# Patient Record
Sex: FEMALE | Race: WHITE | Employment: STUDENT | URBAN - METROPOLITAN AREA
[De-identification: names, ages, dates, MRNs, and addresses within clinical notes are randomized per-mention and may not be internally consistent; named-entity substitution may affect disease eponyms.]

---
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
## 2022-03-14 ENCOUNTER — OFFICE VISIT (OUTPATIENT)
Dept: OBGYN CLINIC | Facility: CLINIC | Age: 22
End: 2022-03-14
Payer: COMMERCIAL

## 2022-03-14 VITALS
DIASTOLIC BLOOD PRESSURE: 73 MMHG | SYSTOLIC BLOOD PRESSURE: 113 MMHG | HEART RATE: 70 BPM | WEIGHT: 114 LBS | BODY MASS INDEX: 20 KG/M2

## 2022-03-14 DIAGNOSIS — Z76.0 MEDICATION REFILL: ICD-10-CM

## 2022-03-14 DIAGNOSIS — Z01.419 WELL WOMAN EXAM WITH ROUTINE GYNECOLOGICAL EXAM: Primary | ICD-10-CM

## 2022-03-14 PROCEDURE — 99395 PREV VISIT EST AGE 18-39: CPT | Performed by: CLINICAL NURSE SPECIALIST

## 2022-03-14 PROCEDURE — 3078F DIAST BP <80 MM HG: CPT | Performed by: CLINICAL NURSE SPECIALIST

## 2022-03-14 PROCEDURE — 3074F SYST BP LT 130 MM HG: CPT | Performed by: CLINICAL NURSE SPECIALIST

## 2022-03-14 RX ORDER — LEVONORGESTREL AND ETHINYL ESTRADIOL 0.1-0.02MG
1 KIT ORAL DAILY
Qty: 84 TABLET | Refills: 4 | Status: SHIPPED | OUTPATIENT
Start: 2022-03-14

## 2024-05-25 ENCOUNTER — OFFICE VISIT (OUTPATIENT)
Dept: FAMILY MEDICINE CLINIC | Facility: CLINIC | Age: 24
End: 2024-05-25

## 2024-05-25 VITALS
BODY MASS INDEX: 18.89 KG/M2 | TEMPERATURE: 99 F | WEIGHT: 110.63 LBS | HEART RATE: 105 BPM | SYSTOLIC BLOOD PRESSURE: 114 MMHG | RESPIRATION RATE: 20 BRPM | HEIGHT: 64 IN | DIASTOLIC BLOOD PRESSURE: 72 MMHG | OXYGEN SATURATION: 99 %

## 2024-05-25 DIAGNOSIS — J06.9 URI WITH COUGH AND CONGESTION: Primary | ICD-10-CM

## 2024-05-25 PROCEDURE — 99203 OFFICE O/P NEW LOW 30 MIN: CPT | Performed by: NURSE PRACTITIONER

## 2024-05-25 PROCEDURE — 3074F SYST BP LT 130 MM HG: CPT | Performed by: NURSE PRACTITIONER

## 2024-05-25 PROCEDURE — 3078F DIAST BP <80 MM HG: CPT | Performed by: NURSE PRACTITIONER

## 2024-05-25 PROCEDURE — 3008F BODY MASS INDEX DOCD: CPT | Performed by: NURSE PRACTITIONER

## 2024-05-25 NOTE — PROGRESS NOTES
CHIEF COMPLAINT:     Chief Complaint   Patient presents with    Sinus Problem     Week w/ congestion and head pressure.       HPI:   Randy Joseph is a 24 year old female who presents for cold symptoms for  6 days. Symptoms have progressed into  nasal congestion, occasional cough,denies  ear pain, no sore throat. Has treated symptoms with Sudafed. Denies fever, chills, sweats, SOB, CP, abdominal pain or nausea.      Current Outpatient Medications   Medication Sig Dispense Refill    tretinoin 0.025 % External Cream Apply a pea-sized amount to the entire face every other night for 2 weeks then every night thereafter 45 g 2    SRONYX 0.1-20 MG-MCG Oral Tab Take 1 tablet by mouth daily. (Patient not taking: Reported on 5/25/2024) 84 tablet 4    levonorgestrel 1.5 MG Oral Tab  (Patient not taking: Reported on 3/14/2022)      Crisaborole (EUCRISA) 2 % External Ointment Apply to the red itchy areas QD. (Patient not taking: Reported on 5/25/2024) 60 g 11      Past Medical History:    Eczema      History reviewed. No pertinent surgical history.   Family History   Problem Relation Age of Onset    Diabetes Maternal Grandmother     Diabetes Paternal Grandmother     Diabetes Paternal Grandfather     Other (Other) Mother         DVT on birth control    Heart Disorder Neg       Social History     Socioeconomic History    Marital status: Single   Tobacco Use    Smoking status: Never    Smokeless tobacco: Never   Substance and Sexual Activity    Alcohol use: No     Alcohol/week: 0.0 standard drinks of alcohol    Drug use: No    Sexual activity: Not Currently     Partners: Male     Birth control/protection: Condom   Social History Narrative    9th- [employer]    Periods are regular.          REVIEW OF SYSTEMS:   GENERAL:  usual appetite  SKIN: no rashes or abnormal skin lesions  HEENT: See HPI.    LUNGS: denies shortness of breath or wheezing   CARDIOVASCULAR: denies chest pain or palpitations   GI: denies  abdominal pain or nausea  NEURO:  denies dizziness    EXAM:   /72   Pulse 105   Temp 99.2 °F (37.3 °C)   Resp 20   Ht 5' 4\" (1.626 m)   Wt 110 lb 9.6 oz (50.2 kg)   LMP 04/24/2024 (Exact Date)   SpO2 99%   BMI 18.98 kg/m²   GENERAL: alert and oriented x 3, no acute distress  SKIN: no rashes, no suspicious lesions  HEAD: atraumatic, normocephalic, no tenderness of sinuses.  EYES: conjunctiva clear, EOM intact  EARS: TM's clear, pearly with visible reflex, bony landmarks visualized.  NOSE: nostrils patent, turbinates intact, nasal mucosa pink and moist  THROAT: oral mucosa pink and moist. No visible dental caries. Posterior pharynx with not erythema, no exudates, uvula midline.  NECK: supple, non-tender  LUNGS: non labored breathing,  clear to auscultation bilaterally, no wheezing, rales, or rhonchi.  CARDIO: RRR without murmur  EXTREMITIES: no cyanosis, clubbing or edema  LYMPH:  No cervical lymphadenopathy.          ASSESSMENT:   Randy Joseph is a 24 year old female who presents with Sinus Problem (Week w/ congestion and head pressure.). Denies any fever during last 6 days, reports nasal congestion. Symptoms are consistent with:    Encounter Diagnosis   Name Primary?    Nasal congestion Yes       PLAN   Discontinue use of Sudafed.  Continue use of Xyzal.  Educated on use of stream and nasal saline and Flonase to reduce nasal congestion.   Maintain fluid intake for continued hydration.   Nasal saline to keep nasal passages moist and promote sinus drainage.   Tylenol or Motrin prn pain/fever.  Risks, benefits, and side effects of medication explained and discussed.     Follow up prn or with pcp if symptoms worsen or persist within 2-3 days as discussed.  Patient understands and agrees with plan.     Mikaela Tang, APRN  5/25/2024  4:42 PM

## 2025-05-21 ENCOUNTER — OFFICE VISIT (OUTPATIENT)
Dept: OBGYN CLINIC | Facility: CLINIC | Age: 25
End: 2025-05-21
Payer: COMMERCIAL

## 2025-05-21 VITALS
HEART RATE: 69 BPM | DIASTOLIC BLOOD PRESSURE: 68 MMHG | SYSTOLIC BLOOD PRESSURE: 103 MMHG | WEIGHT: 108.38 LBS | BODY MASS INDEX: 19 KG/M2

## 2025-05-21 DIAGNOSIS — Z12.4 SCREENING FOR MALIGNANT NEOPLASM OF CERVIX: ICD-10-CM

## 2025-05-21 DIAGNOSIS — Z01.419 WELL WOMAN EXAM WITH ROUTINE GYNECOLOGICAL EXAM: Primary | ICD-10-CM

## 2025-05-21 PROCEDURE — 99385 PREV VISIT NEW AGE 18-39: CPT | Performed by: NURSE PRACTITIONER

## 2025-05-21 PROCEDURE — 3078F DIAST BP <80 MM HG: CPT | Performed by: NURSE PRACTITIONER

## 2025-05-21 PROCEDURE — 3074F SYST BP LT 130 MM HG: CPT | Performed by: NURSE PRACTITIONER

## 2025-05-21 NOTE — PROGRESS NOTES
The following individual(s) verbally consented to be recorded using ambient AI listening technology and understand that they can each withdraw their consent to this listening technology at any point by asking the clinician to turn off or pause the recording:    Patient name: Randy Joseph  Additional names:     HLD (hyperlipidemia)    HTN (hypertension)    Myocardial infarction    Prostate CA    Stented coronary artery

## 2025-05-21 NOTE — PROGRESS NOTES
[facility]    Obstetrics and Gynecology    Chief Complaint   Patient presents with    Gyn Exam     ANNUAL EXAM        Randy Joseph is a 25 year old female  Patient's last menstrual period was 2025 (exact date). presenting for annual gynecology exam. Last seein in  with IQRA Menchaca RN. Stopped ocps after that visit.  History of Present Illness  She has not had a Pap smear before as she was not yet 23 at her last visit in . Her menstrual cycles are regular, occurring monthly and lasting four to five days, without issues of flow or dysmenorrhea. She discontinued birth control pills after last visit due to lack of sexual activity and does not wish to restart them. She has had one new sexual partner since her last visit and uses condoms for contraception. She experiences no dyspareunia or postcoital bleeding. She has no history of sexually transmitted infections and does not wish to undergo STD testing today.    She is currently taking allergy medication and has no other medications. She does not smoke, vape, or use recreational drugs, and her alcohol consumption is minimal. Her only surgery since the last visit was the removal of her wisdom teeth.    Her family history includes her mother having a blood clot while on birth control and premature ovarian failure. There is a history of diabetes in her maternal grandmother, mother, and paternal grandfather. There is no known family history of breast, colon, uterine, or ovarian cancer.  Menses qmonth, lasts 4-5 days, normal flow, no pain.    Pap:never   Contraception:condoms  Mammo: n/a    OBSTETRICS HISTORY:  OB History    Para Term  AB Living   0 0 0 0 0 0   SAB IAB Ectopic Multiple Live Births   0 0 0 0 0       GYNE HISTORY:  Menarche: 15 YEARS OLD (2025  3:22 PM)  Period Cycle (Days): 28-30 (2025  3:22 PM)  Period Duration (Days): 4-5 (2025  3:22 PM)  Period Flow: MODERATE  (2025  3:22 PM)  Use of Birth  Control (if yes, specify type): Condoms (5/21/2025  3:22 PM)  Follow Up Recommendation: ANNUAL 7/23/20 MYRA HERBERT (5/21/2025  3:22 PM)      History   Sexual Activity    Sexual activity: Not Currently    Partners: Male    Birth control/ protection: Condom       MEDICAL HISTORY:  Past Medical History:    Eczema     Past Surgical History:   Procedure Laterality Date    [place] teeth removed         SOCIAL HISTORY:  Social History     Socioeconomic History    Marital status: Single     Spouse name: Not on file    Number of children: Not on file    Years of education: Not on file    Highest education level: Not on file   Occupational History    Not on file   Tobacco Use    Smoking status: Never    Smokeless tobacco: Never   Substance and Sexual Activity    Alcohol use: No     Alcohol/week: 0.0 standard drinks of alcohol    Drug use: No    Sexual activity: Not Currently     Partners: Male     Birth control/protection: Condom   Other Topics Concern    Not on file   Social History Narrative    [employer]th- [employer]    Periods are regular.      Social Drivers of Health     Food Insecurity: Not on file   Transportation Needs: Not on file   Stress: Not on file   Housing Stability: Not on file         Depression Screening (PHQ-2/PHQ-9): Over the LAST 2 WEEKS   Little interest or pleasure in doing things (over the last two weeks)?: Not at all    Feeling down, depressed, or hopeless (over the last two weeks)?: Not at all    PHQ-2 SCORE: 0           FAMILY HISTORY:  Family History   Problem Relation Age of Onset    Other (Other) Mother         DVT on birth control    Other (premature ovarian failure) Mother     No Known Problems Father     No Known Problems Brother     Diabetes Maternal Grandmother     Diabetes Paternal Grandmother     Diabetes Paternal Grandfather     Heart Disorder Neg        MEDICATIONS:  No current outpatient medications on file.    ALLERGIES:  No Known Allergies      Review of  Systems:  Constitutional:  Denies fatigue, night sweats, hot flashes  Eyes:  denies blurred or double vision  Cardiovascular:  denies chest pain or palpitations  Respiratory:  denies shortness of breath  Gastrointestinal:  denies heartburn, abdominal pain, diarrhea or constipation  Genitourinary:  denies dysuria, incontinence, abnormal vaginal discharge, vaginal itching,   Musculoskeletal:  denies back pain   Skin/Breast:  Denies any breast pain, lumps, or discharge.   Neurological:  denies headaches, extremity weakness or numbness.  Psychiatric: denies depression or anxiety.  Endocrine:   denies excessive thirst or urination.  Heme/Lymph:  denies history of anemia, easy bruising or bleeding.      PHYSICAL EXAM:     Vitals:    05/21/25 1521   BP: 103/68   Pulse: 69   Weight: 108 lb 6.4 oz (49.2 kg)       Body mass index is 18.61 kg/m².     Patient offered chaperone, patient declined    Constitutional: well developed, well nourished  Psychiatric:  Oriented to time, place, person and situation. Appropriate mood and affect  Head/Face: normocephalic  Neck/Thyroid: thyroid symmetric, no thyromegaly, no nodules, no adenopathy  Lymphatic:no abnormal supraclavicular or axillary adenopathy is noted  Breast: normal without palpable masses, tenderness, asymmetry, nipple discharge, nipple retraction or skin changes  Abdomen:  soft, nontender, nondistended, no masses  Skin/Hair: no unusual rashes or bruises  Extremities: no edema, no cyanosis    Pelvic Exam:  External Genitalia: normal appearance, hair distribution, and no lesions  Urethral Meatus:  normal in size, location, without lesions and prolapse  Bladder:  No fullness, masses or tenderness  Vagina:  Normal appearance without lesions, no abnormal discharge  Cervix:  Normal without tenderness on motion  Uterus: normal in size, contour, position, mobility, without tenderness  Adnexa: normal without masses or tenderness  Perineum: normal  Anus: no hemorroids     Assessment &  Plan:    ICD-10-CM    1. Well woman exam with routine gynecological exam  Z01.419       2. Screening for malignant neoplasm of cervix  Z12.4 ThinPrep PAP with HPV Reflex Request B     ThinPrep PAP with HPV Reflex Request B     ThinPrep PAP with HPV Reflex Request           Assessment & Plan    Pap done today  Contraception - condoms    Discussed diet, exercise, MVIs with Ca/Vit D  Follow up in 1 yr for WWE    Eleonora Yepez, NADINE      This note was prepared using Dragon Medical voice recognition dictation software. As a result errors may occur. When identified these errors have been corrected. While every attempt is made to correct errors during dictation discrepancies may still exist.

## (undated) NOTE — LETTER
VACCINE ADMINISTRATION RECORD  PARENT / GUARDIAN APPROVAL  Date: 1/3/2018  Vaccine administered to: Rosa Castro     : 2000    MRN: [id number]    A copy of the appropriate Centers for Disease Control and Prevention Vaccine Information statement

## (undated) NOTE — LETTER
Kane Erickson, Do  [address]  [place], [place]       12/27/18        Patient: Aldair Mason   YOB: 2000   Date of Visit: 12/27/2018       Dear  Dr. Parrish Mir, ,      Thank you for referring Aldair Mason to my pr

## (undated) NOTE — LETTER
VACCINE ADMINISTRATION RECORD  PARENT / GUARDIAN APPROVAL  Date: 2019  Vaccine administered to: Pat Kathleen     : 2000    MRN: [id number]    A copy of the appropriate Centers for Disease Control and Prevention Vaccine Information statement

## (undated) NOTE — LETTER
Name:  Gilberto Beard Year:  11th Grade Class: Student ID No.:   Address:  [address] [place] [address] Phone:  [phone] (home)  :  14year old   Name Relationship [address] Work Phone Home Phone Mobile Phone   1.  Gunnar Rodgers 10. Do you get lightheaded or feel more short of breath than expected during exercise? 11. Have you ever had an unexplained seizure? 12. Do you get more tired or short of breath more quickly than your friends during exercise?      [facility] 34. Were you born without or are you missing a kidney, eye, testicle, spleen, or any other organ? 30. Do you have a groin pain or a painful bulge or hernia in the groin area?     31. Have you had infectious mono within the last month?     28.  Do you ha Signature of athlete: _____________________________________   Signature of parent/guardian: __________________________________________   Date:1/16/2019               EXAMINATION   /72   Pulse 62   Ht 5' 4\" (1.626 m)   Wt 44.5 kg (98 lb)   BMI 16.82 Advanced Nurse Practitioner's Signature*      Roldan Camacho DO    *effective January 2003, the [facility] Board of Directors approved a recommendation, consistent with the [facility], that allows [employer] or Advanced Nurse [address] [employer] for [address] [place] [address], [address] [place] [address] [address] [address] [employer]. Permission granted to reprint for noncommercial, educational purposes with acknowledgment.    [id number]

## (undated) NOTE — ED AVS SNAPSHOT
[facility] in [address] [facility]  [address] [facility] [address]    Phone:  [phone]    Fax:  [phone]           Rachid Akil   MRN: [id number]    Department:  [facility] in [address] [place] [address]   Date of Visit:  3 Insurance plans vary and the physician(s) referred by the Immediate Care may not be covered by your plan. It is possible that the physician may not participate in your health insurance plan.   This may result in a lower benefit level being available to yo CARE PHYSICIAN AT ONCE OR GO TO THE EMERGENCY DEPARTMENT. If you have been prescribed any medication(s), please fill your prescription right away and begin taking the medication(s) as directed.   If you believe that any of the medications or instructions - If you have concerns related to behavioral health issues or thoughts of harming yourself, contact [address] [place] [address] at [phone].     - If you don’t have insurance, Arlene Stanlye has partnered with [employer]

## (undated) NOTE — LETTER
Name:  Abelinozander RomeoEvelyn Year:  12th Grade Class: Student ID No.:   Address:  [address] [place] [address] Phone:  [phone] (home) [phone] (work) :  18year old   Name Relationship 131Anthony Inman Work [employer] 15. Does anyone in your family have hypertrophic cardiomyopathy, Marfan syndrome, arrhythmogenic right ventricular cardiomyopathy, long QT syndrome, short QT syndrome, Brugada syndrome, or catecholaminergic polymorphic ventricular tachycardia?      15. Does 35. Have you ever had a hit or blow to the head that caused confusion, prolonged headache, or memory problems? 36. Do you have a history of seizure disorder?     37. Do you have headaches with exercise?      38. Have you ever had numbness, tingling, or Appearance:  Marfan stigmata (kyphoscoliosis, high-arched palate, pectus excavatum,      arachnodactyly, arm span > height, hyperlaxity, myopia, MVP, aortic insufficiency) Yes    Eyes/Ears/Nose/Throat:  Pupils equal    Hearing Yes    Lymph nodes Yes    Hea As a prerequisite to participation in [facility] athletic activities, we agree that I/our student will not use performance-enhancing substances as defined in the [facility] Performance-Enhancing Substance Testing Program Protocol.  We have reviewed the policy and under

## (undated) NOTE — LETTER
Name:  Cheryl Galeana Year:  10th Grade Class: Student ID No.:   Address:  [address] [place] [address] Phone:  [phone] (home)  :  15year old   Name Relationship [address] Work Phone Home Phone Mobile Phone   1.  [place] syndrome, short QT syndrome, Brugada syndrome, or catecholaminergic polymorphic ventricular tachycardia? 13. Does anyone in your family have a heart problem, pacemaker, or implanted defibrillator?      12. Has anyone in your family had unexplained faint 37. Do you have headaches with exercise? 38. Have you ever had numbness, tingling, or weakness in your arms or legs after being hit or falling? 39.Have you ever been unable to move your arms / legs after being hit /fall? 40.  Have you ever becom Eyes/Ears/Nose/Throat:  Pupils equal    Hearing Yes    Lymph nodes Yes    Heart*  · Murmurs (auscultation standing, supine, +/- Valsalva)  · Location of point of maximal impulse (PMI) Yes    Pulses Yes    Lungs Yes    Abdomen Yes    Genitourinary (males on Protocol.  We have reviewed the policy and understand that I/our student may be asked to submit to testing for the presence of performance-enhancing substances in my/his/her body either during [employer] state series events or during the school day, and I/our freddie

## (undated) NOTE — LETTER
[facility] [employer] Child Health Examination       Student's Name  Joni Irizarry Birth D Signature                                                                                                                                   Title   DO                        Date  1/16/[birth year]   Signature Female School   Grade Level/ID#  11th Grade   HEALTH HISTORY          TO BE COMPLETED AND SIGNED BY PARENT/GUARDIAN AND VERIFIED BY HEALTH CARE PROVIDER    ALLERGIES  (Food, drug, insect, other)  Patient has no known allergies.  MEDICATION  (List all prescr age 50 (Cause?)    Yes   No    Eye/Vision problems?   Yes  No   Glasses  Yes   No  Contacts  Yes    No   Last eye exam___  Other concerns? (crossed eye, drooping lids, squinting, difficulty reading) Dental:  ____Braces    ____Bridge    ____Plate    ____Othe Blood Test:   Date Reported          /      /              Result:                  Value ______________               LAB TESTS (Recommended) Date Results  Date Results   Hemoglobin or Hematocrit   Sickle Cell  (when indicated)     Urinalysis   Dev Physician/Advanced Practice Nurse/Physician Assistant performing examination  Print Name  Stuart Pena DO                                                 Signature                                                                                Date  1/1